# Patient Record
Sex: MALE | Race: BLACK OR AFRICAN AMERICAN | NOT HISPANIC OR LATINO | Employment: UNEMPLOYED | URBAN - METROPOLITAN AREA
[De-identification: names, ages, dates, MRNs, and addresses within clinical notes are randomized per-mention and may not be internally consistent; named-entity substitution may affect disease eponyms.]

---

## 2023-06-27 ENCOUNTER — PATIENT OUTREACH (OUTPATIENT)
Dept: CARE COORDINATION | Facility: CLINIC | Age: 28
End: 2023-06-27
Payer: COMMERCIAL

## 2023-12-14 ENCOUNTER — APPOINTMENT (OUTPATIENT)
Dept: ORTHOPEDIC SURGERY | Facility: HOSPITAL | Age: 28
End: 2023-12-14

## 2024-01-17 ENCOUNTER — HOSPITAL ENCOUNTER (OUTPATIENT)
Dept: RADIOLOGY | Facility: HOSPITAL | Age: 29
Discharge: HOME | End: 2024-01-17
Payer: COMMERCIAL

## 2024-01-17 ENCOUNTER — OFFICE VISIT (OUTPATIENT)
Dept: ORTHOPEDIC SURGERY | Facility: HOSPITAL | Age: 29
End: 2024-01-17
Payer: COMMERCIAL

## 2024-01-17 DIAGNOSIS — Z87.81 HISTORY OF PELVIC FRACTURE: ICD-10-CM

## 2024-01-17 PROCEDURE — 99213 OFFICE O/P EST LOW 20 MIN: CPT | Performed by: ORTHOPAEDIC SURGERY

## 2024-01-17 PROCEDURE — 72190 X-RAY EXAM OF PELVIS: CPT

## 2024-01-17 PROCEDURE — 72190 X-RAY EXAM OF PELVIS: CPT | Mod: MUE

## 2024-01-17 PROCEDURE — 72190 X-RAY EXAM OF PELVIS: CPT | Performed by: STUDENT IN AN ORGANIZED HEALTH CARE EDUCATION/TRAINING PROGRAM

## 2024-01-17 NOTE — PROGRESS NOTES
Shimon Varela is  post-op from [facility] right acetabular fracture on 6/16/2023.  he is doing well at this point.  Pain is well controlled  Denies fevers or chills.  Denies drainage from the wound.  he reports no additional symptoms or concerns. No shortness of breath or chest pain. No calf swelling or pain.    The patients full medical history, surgical history, medications, allergies, family, medical history, social history, and a complete 14 point review of systems is documented in the medical record on the signed, scanned medical intake sheet or reviewed in the history of present illness. Review of systems otherwise negative    No past medical history on file.    Medication Documentation Review Audit    **Prior to Admission medications have not yet been reviewed**         Not on File    Social History     Socioeconomic History    Marital status: Single     Spouse name: Not on file    Number of children: Not on file    Years of education: Not on file    Highest education level: Not on file   Occupational History    Not on file   Tobacco Use    Smoking status: Not on file    Smokeless tobacco: Not on file   Substance and Sexual Activity    Alcohol use: Not on file    Drug use: Not on file    Sexual activity: Not on file   Other Topics Concern    Not on file   Social History Narrative    Not on file     Social Determinants of Health     Financial Resource Strain: Not on file   Food Insecurity: Not on file   Transportation Needs: Not on file   Physical Activity: Not on file   Stress: Not on file   Social Connections: Not on file   Intimate Partner Violence: Not on file   Housing Stability: Not on file       No past surgical history on file.    Gen: The patient is alert and oriented ×3, is in no acute distress, and appear their stated age and weight.    Psychiatric: Mood and affect are appropriate.    Eyes: Sclera are white, and pupils are round and symmetric.    ENT: Mucous membranes are moist.     Neck: Supple.  Thyroid is midline.    Respiratory: Respirations are nonlabored, chest rise is symmetric.    Cardiac: Rate is regular by palpation of distal pulses.     Abdomen: Nondistended.    Integument: No obvious cutaneous lesions are noted. No signs of lymphangitis. No signs of systemic edema.  side: right lower extremity :  his  surgical incisions are healing well, without evidence of erythema, fluctuance, drainage, or infection.  The skin around the incision is intact.  Distally neurovascular exam is stable.  There is appropriate tenderness to palpation in the aida-incisional area. No calf swelling or tenderness to palpation.      I personally reviewed multiple views of pelvis were obtained in the office today demonstrate maintenance of reduction, interval healing, and a stable position of the hardware.      Shimon Varela is a 28 y.o. male patient status post [facility] right acetabular fracture on 6/16/2023.   I went over his x-rays in detail today.   he is WBAT of the side: right lower extremity. ~He/she~ is range of motion as tolerated of the side: right lower extremity.  I stressed the importance of physical therapy on overall functional outcome. I answered all patient's questions he agrees with treatment plan.  I will see him back in Follow-up 6 month(s)with repeat 5 views of the pelvis AP inlet outlet and judet.        Chapincito Rowe    Department of Orthopaedic Trauma Surgery

## 2024-07-17 ENCOUNTER — HOSPITAL ENCOUNTER (OUTPATIENT)
Dept: RADIOLOGY | Facility: HOSPITAL | Age: 29
Discharge: HOME | End: 2024-07-17
Payer: COMMERCIAL

## 2024-07-17 ENCOUNTER — OFFICE VISIT (OUTPATIENT)
Dept: ORTHOPEDIC SURGERY | Facility: HOSPITAL | Age: 29
End: 2024-07-17
Payer: COMMERCIAL

## 2024-07-17 DIAGNOSIS — Z87.81 HISTORY OF PELVIC FRACTURE: ICD-10-CM

## 2024-07-17 PROCEDURE — 72190 X-RAY EXAM OF PELVIS: CPT

## 2024-07-17 PROCEDURE — 99213 OFFICE O/P EST LOW 20 MIN: CPT | Performed by: ORTHOPAEDIC SURGERY

## 2024-07-17 NOTE — PROGRESS NOTES
Shimon Varela is  post-op from [facility] right acetabular fracture on 6/16/2023.  he is doing well at this point.  Pain is well controlled  Denies fevers or chills.  Denies drainage from the wound.  he reports no additional symptoms or concerns. No shortness of breath or chest pain. No calf swelling or pain.    The patients full medical history, surgical history, medications, allergies, family, medical history, social history, and a complete 14 point review of systems is documented in the medical record on the signed, scanned medical intake sheet or reviewed in the history of present illness. Review of systems otherwise negative    No past medical history on file.    Medication Documentation Review Audit       Reviewed by hCapincito Rowe MD (Physician) on 01/17/24 at 1302      Medication Order Taking? Sig Documenting Provider Last Dose Status            No Medications to Display                                   No Known Allergies    Social History     Socioeconomic History    Marital status: Single     Spouse name: Not on file    Number of children: Not on file    Years of education: Not on file    Highest education level: Not on file   Occupational History    Not on file   Tobacco Use    Smoking status: Unknown    Smokeless tobacco: Not on file   Substance and Sexual Activity    Alcohol use: Not on file    Drug use: Yes     Types: Marijuana    Sexual activity: Not on file   Other Topics Concern    Not on file   Social History Narrative    Not on file     Social Determinants of Health     Financial Resource Strain: Not on file   Food Insecurity: Not on file   Transportation Needs: Not on file   Physical Activity: Not on file   Stress: Not on file   Social Connections: Not on file   Intimate Partner Violence: Not on file   Housing Stability: Not on file       No past surgical history on file.    Gen: The patient is alert and oriented ×3, is in no acute distress, and appear their stated age and  weight.    Psychiatric: Mood and affect are appropriate.    Eyes: Sclera are white, and pupils are round and symmetric.    ENT: Mucous membranes are moist.     Neck: Supple. Thyroid is midline.    Respiratory: Respirations are nonlabored, chest rise is symmetric.    Cardiac: Rate is regular by palpation of distal pulses.     Abdomen: Nondistended.    Integument: No obvious cutaneous lesions are noted. No signs of lymphangitis. No signs of systemic edema.  side: right lower extremity :  his  surgical incisions are healing well, without evidence of erythema, fluctuance, drainage, or infection.  The skin around the incision is intact.  Distally neurovascular exam is stable.  There is appropriate tenderness to palpation in the aida-incisional area. No calf swelling or tenderness to palpation.      I personally reviewed multiple views of pelvis were obtained in the office today demonstrate maintenance of reduction, interval healing, and a stable position of the hardware.      Shimon Varela is a 29 y.o. male patient status post [facility] right acetabular fracture on 6/16/2023.   I went over his x-rays in detail today.   he is WBAT of the side: right lower extremity. ~He/she~ is range of motion as tolerated of the side: right lower extremity.  I stressed the importance of physical therapy on overall functional outcome. I answered all patient's questions he agrees with treatment plan.  I will see him back in Follow-up  as necessary  with repeat 5 views of the pelvis AP inlet outlet and judetNikolai Rowe    Department of Orthopaedic Trauma Surgery